# Patient Record
Sex: MALE | Race: ASIAN | URBAN - METROPOLITAN AREA
[De-identification: names, ages, dates, MRNs, and addresses within clinical notes are randomized per-mention and may not be internally consistent; named-entity substitution may affect disease eponyms.]

---

## 2017-07-10 ENCOUNTER — TELEPHONE (OUTPATIENT)
Dept: TRANSPLANT | Facility: CLINIC | Age: 62
End: 2017-07-10

## 2017-07-10 NOTE — LETTER
August 4, 2017    Dada Hansen  [address] [facility] [address]      Dear Mr. Hansen,    Attached is your Pre Kidney Evaluation schedule on August 14 & 16, 2017. Please feel free to contact me at [phone], if you have any questions.       Sincerely,     Nicole MARIANO    CC:Susan DEY                                                      [facility]  [address] [place] [address]  [id number]    EVALUATION SCHEDULE: KIDNEY TRANSPLANT SLOT 1 EVAL    Patient:   DADA HANSEN   MR#:    [id number]  Coordinator:  Susan DEY     [phone]  :   Nicole MARIANO     [phone]  Location:   [facility] 3[facility]  Date:    August 14 & 16, 2017    This is your evaluation schedule, please follow dates and times.  You   will receive reminder phone calls for other tests, but please follow this  schedule only!  If you have any questions about dates and times,  please call us on number listed above.  Thank you, Transplant Clinic.     Day/Date:   Monday, August 14, 2017  Time Location Activity   10:00a.m. [facility]  Transplant Services  3rd floor; Clinic 3A  CONSULT ROOM 3.8 Pre-transplant class with Susan DEY     NO FOOD or DRINK AFTER MIDNIGHT  (You may only have small amounts of water)    Day/Date:   Wednesday, August 16, 2017  Time Location Activity   7:30a.m. [facility]  Imaging and Lab Testing  1st floor Blood tests (fasting, PLEASE)   8:00a.m. BREAKFAST    8:30a.m. [facility]  Transplant Services  3rd floor; Clinic 3A Check in & go thru evaluation schedule for the day, get vitals & medication records   9:00a.m. [facility]  Transplant Services  3rd floor; Clinic 3A Appointment with Dr. Plata,   Transplant Surgeon   9:45a.m. [facility]  Transplant Services  3rd floor; Clinic 3B Appointment with   Christoph,  Transplant Nephrologist   10:30a.m. [facility]  Transplant Services  3rd floor; Clinic 3A; Consult Room Appointment with either Leslie Foote  Transplant    11:15a.m. [facility]  Transplant Services  3rd floor; Clinic 3A Meet with Susan DEY  Transplant Coordinator   11:30a.m. [facility]  Transplant Services  3rd floor; Clinic 3A; Consult Room Appointment with Elisabeth Reina,  Registered Dietitian   12:00Noon-12:15p.m. [facility]  Transplant Services  3rd floor; Clinic 3A; Consult Room Research    12:15p.m.-  1:15p.m. LUNCH    1:30p.m. [facility]  Imaging and Lab Testing  1st floor 2nd ABO blood draw - confirmation of 1st draw   2:00p.m. [facility]  Imaging and Lab Testing  1st floor Chest X-ray    2:20p.m. [facility]  Imaging and Lab Testing  1st floor EKG   3:30p.m. [facility]   Cardiology/Heart Care Clinic  3rd floor; Clinic 3L Echocardiogram       PLEASE CALL [phone] TO SCHEDULE:     ? ADAN SCAN STRESS TEST  ? AORTA/ILIAC ULTRASOUND  ? AORTA ULTRASOUND   ? CARDIOLOGY CONSULT

## 2017-07-10 NOTE — LETTER
07/12/17    Dada Medley  [address] [facility] [address]          Dear Dada,    Thank you for your interest in the Transplant Center at [facility], [facility]. We look forward to being a part your care team and assisting you through the transplant process.    As we discussed, your transplant coordinator is Susan Keyes ([phone]).  You may call your coordinator at any time with questions or concerns.    Please complete the following.    1. Sign up for:    [employer], your electronic medical record    [employer], the Transplant Center's website (see enclosed booklet)    You can use these tools to learn more about your transplant, communicate with your care team, and track your medical details    2. Fill out and return the enclosed forms    Authorization for Electronic Communication    Authorization to Discuss Protected Health Information    [employer] Release of Information       Best Wishes,      Solid Organ Transplant Intake  [facility], [facility]    cc: Referring Physician: Jake Reyes

## 2017-07-12 NOTE — TELEPHONE ENCOUNTER
Intake Progress Note  Organ:  Kidney    Referral Came Via Fax    Referring Physician (outside provider, if patient does not remember the name of provider contact clinic or dialysis unit to get this information) :  Jake Reyes  (If inside referral, ask who their community nephrologist is that sent the to [facility])    Assigned Coordinator:  Susan Keyes    Reported Diagnosis that caused the kidney failure ( not CKD)  Kidney Failure    Best time patient can be reached:  anytime    How would you like to be communicated with, through MyChart, phone, or mail? [place], phone      Records:  [facility] from: [facility], Dr. Reyes, Renal Specialist of [facility], [facility]       Insurance information:  BC  Policy parks:  self  Subscriber/policy/ID number:  [id number]  Group Number:  [id number]    History of diabetes: no  Type: no    If yes, age of onset:  n/a    Do you have an endocrinologist?: no  Name and Location:  no         On insulin or oral medication:  Oral meds          What type of insulin and how many units? n/a          History of a kidney biopsy:  yes         If Yes,when and where:  [facility], 2017    Past Medical and Surgical History (updated in Epic medical / surgical):             History of  cancer personally:  no, What type? n/a              Where and when was it treated?n/a                            History of cardiac events: no             When and where was it was it treated? no             History abdominal surgeries: no,  other than previous transplant: no What type?n/a              Where and when? n/a              History of previous transplant:n/a             If Yes where and estimated date:  n/a             Listed or in eval at another transplant center?  no             History of hospitalization in last 12 months:  yes               If Yes:  [facility] for a fistula placement, Catheter  placement in chest               History of blood transfusion:  no               Smoking history:  no     Current smoker:  no  How much? n/a      Quit Date:  n/a    On dialysis: yes  [facility]                 Type of Dialysis: hemo   Start date:        Dialysis Days:  T,TH,S from 8-11am       If NYOD, estimated GFR:  n/a  Height:  5'0  Weight:  130  BMI:  25.39    Health Maintenance:  PAP:  n/a  Mammo:  n/a  Colon:  no  PSA:  no  Dental: 2 yrs ago  Vaccines : ?  Special Needs (ie--wheelchair, assistance, guardian, interpretor):  no    As for the next steps, as long as we are able to get financial approval and receive your medical records, you should be expecting a call from your Transplant Coordinator in about 2 weeks. Your Transplant Coordinator will go into more detail about the evaluation process, but we can put a hold on a tentative date for your transplant evaluation now. Kidney (K/P) evaluations are scheduled for Monday, Wednesday, or Thursdays, and can start as early as 6:30 am and end as late as 4:30pm. Would one of these days work better for you? Great, I am going to put a hold on Day/Month for you. Again, this appointment day and time is subject to change and is dependent on financial approval from your insurance company and our receiving your medical records so we are able to meet your individual needs.    I also want to schedule your first call with the coordinator. (Offer a couple choices and schedule patient's preferred date and time.)      Inform patient on the need to arrange age appropriate cancer screening, vaccines up to date and dental clearance    Reviewed evaluation process and reminded patient to complete questionnaire, complete medical records release, and review packet prior to evaluation visit     Informed patient that coordinator will review their chart and insurance coverage and if no concerns they will receive a call from a  to schedule  evaluation

## 2017-07-17 ENCOUNTER — MEDICAL CORRESPONDENCE (OUTPATIENT)
Dept: TRANSPLANT | Facility: CLINIC | Age: 62
End: 2017-07-17

## 2017-07-25 ENCOUNTER — MEDICAL CORRESPONDENCE (OUTPATIENT)
Dept: TRANSPLANT | Facility: CLINIC | Age: 62
End: 2017-07-25

## 2017-07-28 DIAGNOSIS — Z76.82 ORGAN TRANSPLANT CANDIDATE: ICD-10-CM

## 2017-07-28 DIAGNOSIS — Z99.2 END STAGE RENAL DISEASE ON DIALYSIS (H): ICD-10-CM

## 2017-07-28 DIAGNOSIS — I10 HYPERTENSION: Primary | ICD-10-CM

## 2017-07-28 DIAGNOSIS — N18.6 END STAGE RENAL DISEASE ON DIALYSIS (H): ICD-10-CM

## 2017-07-28 NOTE — TELEPHONE ENCOUNTER
I /Susan was asked to May, daughter, I reached her and gave her my name and phone Contacted patient and introduced myself as their Transplant Coordinator, also introduced the role of the Transplant Coordinator in the transplant process.  Explained the purpose of this call including reviewing next steps and answering questions.    Confirmed Referring Provider, Dialysis Center, and Primary Care Physician. Notified patient of the importance of continued communication with referring providers and primary care physicians.    Reviewed components of transplant evaluation process including necessary appointments, tests, and procedures.    Answered questions for patient regarding evaluation, provided my name and contact information and requested they call with any additional questions.    Determined that patient would like additional information regarding transplant by:   Phone Call  Notified patients that they will hear from a Transplant  to schedule evaluation.     PLAN private Hmong class with Malu on 8/14 if possible 10-12 or 1-3 [place] [facility]  Full block on Weds 8/16;  Daughter May would like call to confirm these dates. MAy is driving her father up from [place] on Sunday 8/13.  Call May [phone].  Orders in [facility].  Third day will be with cardiologist in Oct or Nov arrange with May.   Linwood Guerrero MD  [address] [place] [address], [place] [address]  Phone [phone]

## 2017-08-01 ENCOUNTER — DOCUMENTATION ONLY (OUTPATIENT)
Dept: TRANSPLANT | Facility: CLINIC | Age: 62
End: 2017-08-01

## 2017-08-14 ENCOUNTER — ALLIED HEALTH/NURSE VISIT (OUTPATIENT)
Dept: TRANSPLANT | Facility: CLINIC | Age: 62
End: 2017-08-14
Attending: INTERNAL MEDICINE
Payer: COMMERCIAL

## 2017-08-14 DIAGNOSIS — Z01.818 ENCOUNTER FOR PRE-TRANSPLANT EVALUATION FOR KIDNEY TRANSPLANT: Primary | ICD-10-CM

## 2017-08-14 NOTE — PROGRESS NOTES
Patient attended the Pre Kidney/Pancreas Transplant Education Class today with his daughter May and Hmong . All were very attentive and engaged throughout the class and asked few questions. I introduced the [facility] website and encouraged them to access it for further education. In addition to the standard video content. I reviewed living donation paired exchange, KDPI, typical length of stay and the need to stay locally post transplant discharge.

## 2017-08-14 NOTE — MR AVS SNAPSHOT
After Visit Summary   8/14/2017    Dada Medley    MRN: [id number]           Patient Information     Date Of Birth          1955        Visit Information        Provider Department      8/14/2017 10:00 AM [place] [employer]; UC TRANSPLANT CLASS [facility] Solid Organ Transplant        Today's Diagnoses     Encounter for pre-transplant evaluation for kidney transplant    -  1       Follow-ups after your visit        Your next 10 appointments already scheduled     Aug 16, 2017  7:30 AM CDT   (Arrive by 7:00 AM)   Lab with MARLENY LAB   [facility] ([facility])    [address] [place] [id number]   [phone]            Aug 16, 2017  8:45 AM CDT   Surgery Consult with UC PKE PATIENT 1   [facility] Solid Organ Transplant ([facility])    [address] [place] [phone]   [phone]            Aug 16, 2017  9:45 AM CDT   Nephrology Consult with UC PKE PATIENT 1   [facility] Solid Organ Transplant ([facility])    [address] [place] [id number] [phone]            Aug 16, 2017 10:15 AM CDT   SOT SOCIAL WORK EVAL with SAMAN Jones   [facility] Transplant ([facility])    [address] [place] [phone]            Aug 16, 2017 11:00 AM CDT   SOT CARE COORDINATOR EVAL with Susan Keyes, RN   [facility] Transplant ([facility])    [address] [place] [phone]   [phone]            Aug 16, 2017 11:30 AM CDT   NUTRITION VISIT with Smita Reina RD   [facility] Solid Organ Transplant ([facility])    [address] [place] [phone]            Aug 16, 2017  1:30 PM CDT   Lab with UC LAB    [facility] ([facility])    [address] [id number] [phone]            Aug 16, 2017  1:45 PM CDT   XR CHEST 2 VIEWS with [email or web address]   [facility] Xray ([facility])    [address] [place] [phone]           Please bring a list of your current medicines to your exam. (Include vitamins, minerals and over-thecounter medicines.) Leave your valuables at home.  Tell your doctor if there is a chance you may be pregnant.  You do not need to do anything special for this exam.            Aug 16, 2017  2:05 PM CDT   ecg with UC CV EKG   [facility] Xray ([facility])    [address] [place] [phone]            Aug 16, 2017  3:30 PM CDT   Ech Complete with [id number]   [facility] Echo ([facility])    [address] [place] [id number] [phone]           1. Please bring or wear a comfortable two-piece outfit. 2. You may eat, drink and take your normal medicines. 3. For any questions that cannot be answered, please contact the ordering physician              Who to contact     If you have questions or need follow up information about today's clinic visit or your schedule please contact [facility] SOLID ORGAN TRANSPLANT directly at [phone].  Normal or non-critical lab and imaging results will be communicated to you by MyChart, letter or phone within 4 business days after the clinic has received the results. If you do not hear from us within 7 days, please contact the clinic through MyChart or phone. If you have a critical or abnormal lab result, we will notify you by phone as soon as possible.  Submit refill requests through [employer] or call your pharmacy and they will forward the refill request to us. Please allow 3 business days for your refill to be completed.          Additional  Information About Your Visit        [employer] Information     [facility] [employer] gives you secure access to your electronic health record. If you see a primary care provider, you can also send messages to your care team and make appointments. If you have questions, please call your primary care clinic.  If you do not have a primary care provider, please call [phone] and they will assist you.        Care EveryWhere ID     This is your Care EveryWhere ID. This could be used by other organizations to access your [facility] medical records  [id number]         Blood Pressure from Last 3 Encounters:   No data found for BP    Weight from Last 3 Encounters:   No data found for Wt              Today, you had the following     No orders found for display       Primary Care Provider Office Phone # Fax #    Linwood Guerrero [phone]       [address] [address]        Equal Access to Services     MELLISA RAMIREZ : Hadii aad ku hadasho Sohusam, waaxda luqadaha, qaybta kaalmada lonny, ramona gaines . So [facility] [phone].    ATENCIÓN: Si habla español, tiene a polanco disposición servicios gratuitos de asistencia lingüística. Leonardame al [phone].    We comply with applicable federal civil rights laws and [place] laws. We do not discriminate on the basis of race, color, national origin, age, disability sex, sexual orientation or gender identity.            Thank you!     Thank you for choosing [facility] SOLID ORGAN TRANSPLANT  for your care. Our goal is always to provide you with excellent care. Hearing back from our patients is one way we can continue to improve our services. Please take a few minutes to complete the written survey that you may receive in the mail after your visit with us. Thank you!             Your Updated Medication List - Protect others around you: Learn how to safely use, store and throw away your medicines at  www.disposemymeds.org.      Notice  As of 8/14/2017 12:31 PM    You have not been prescribed any medications.

## 2017-08-16 ENCOUNTER — OFFICE VISIT (OUTPATIENT)
Dept: TRANSPLANT | Facility: CLINIC | Age: 62
End: 2017-08-16
Attending: TRANSPLANT SURGERY
Payer: COMMERCIAL

## 2017-08-16 ENCOUNTER — RESULTS ONLY (OUTPATIENT)
Dept: OTHER | Facility: CLINIC | Age: 62
End: 2017-08-16

## 2017-08-16 ENCOUNTER — ALLIED HEALTH/NURSE VISIT (OUTPATIENT)
Dept: TRANSPLANT | Facility: CLINIC | Age: 62
End: 2017-08-16
Attending: INTERNAL MEDICINE
Payer: COMMERCIAL

## 2017-08-16 ENCOUNTER — RADIANT APPOINTMENT (OUTPATIENT)
Dept: CARDIOLOGY | Facility: CLINIC | Age: 62
End: 2017-08-16
Attending: PHYSICIAN ASSISTANT

## 2017-08-16 VITALS
HEART RATE: 86 BPM | BODY MASS INDEX: 26.5 KG/M2 | OXYGEN SATURATION: 98 % | TEMPERATURE: 97 F | DIASTOLIC BLOOD PRESSURE: 72 MMHG | SYSTOLIC BLOOD PRESSURE: 114 MMHG | WEIGHT: 135 LBS | HEIGHT: 60 IN

## 2017-08-16 VITALS
HEIGHT: 60 IN | OXYGEN SATURATION: 98 % | WEIGHT: 135 LBS | DIASTOLIC BLOOD PRESSURE: 72 MMHG | HEART RATE: 86 BPM | SYSTOLIC BLOOD PRESSURE: 114 MMHG | BODY MASS INDEX: 26.5 KG/M2 | TEMPERATURE: 97 F

## 2017-08-16 DIAGNOSIS — I10 HYPERTENSION: ICD-10-CM

## 2017-08-16 DIAGNOSIS — Z76.82 ORGAN TRANSPLANT CANDIDATE: ICD-10-CM

## 2017-08-16 DIAGNOSIS — Z99.2 END STAGE RENAL DISEASE ON DIALYSIS (H): ICD-10-CM

## 2017-08-16 DIAGNOSIS — Z01.818 PRE-TRANSPLANT EVALUATION FOR END STAGE RENAL DISEASE: Primary | ICD-10-CM

## 2017-08-16 DIAGNOSIS — N18.6 END STAGE RENAL DISEASE ON DIALYSIS (H): ICD-10-CM

## 2017-08-16 DIAGNOSIS — Z76.82 ORGAN TRANSPLANT CANDIDATE: Primary | ICD-10-CM

## 2017-08-16 DIAGNOSIS — Z01.818 PRE-OP EXAM: ICD-10-CM

## 2017-08-16 DIAGNOSIS — N18.6 END STAGE RENAL DISEASE ON DIALYSIS (H): Primary | ICD-10-CM

## 2017-08-16 DIAGNOSIS — Z99.2 END STAGE RENAL DISEASE ON DIALYSIS (H): Primary | ICD-10-CM

## 2017-08-16 LAB
ABO + RH BLD: NORMAL
ALBUMIN SERPL-MCNC: 3.6 G/DL (ref 3.4–5)
ALBUMIN UR-MCNC: 100 MG/DL
ALP SERPL-CCNC: 70 U/L (ref 40–150)
ALT SERPL W P-5'-P-CCNC: 28 U/L (ref 0–70)
ANION GAP SERPL CALCULATED.3IONS-SCNC: 13 MMOL/L (ref 3–14)
APPEARANCE UR: CLEAR
APTT PPP: 29 SEC (ref 22–37)
AST SERPL W P-5'-P-CCNC: 17 U/L (ref 0–45)
BASOPHILS # BLD AUTO: 0.1 10E9/L (ref 0–0.2)
BASOPHILS NFR BLD AUTO: 0.6 %
BILIRUB SERPL-MCNC: 0.5 MG/DL (ref 0.2–1.3)
BILIRUB UR QL STRIP: NEGATIVE
BLD GP AB SCN SERPL QL: NORMAL
BLD GP AB SCN TITR SERPL: NORMAL {TITER}
BLOOD BANK CMNT PATIENT-IMP: NORMAL
BUN SERPL-MCNC: 63 MG/DL (ref 7–30)
CALCIUM SERPL-MCNC: 8.5 MG/DL (ref 8.5–10.1)
CARDIOLIPIN ANTIBODY IGG: <1.6 GPL-U/ML (ref 0–19.9)
CARDIOLIPIN ANTIBODY IGM: 0.2 MPL-U/ML (ref 0–19.9)
CHLORIDE SERPL-SCNC: 96 MMOL/L (ref 94–109)
CHOLEST SERPL-MCNC: 194 MG/DL
CMV IGG SERPL QL IA: >8 AI (ref 0–0.8)
CO2 SERPL-SCNC: 26 MMOL/L (ref 20–32)
COLOR UR AUTO: ABNORMAL
CREAT SERPL-MCNC: 6.15 MG/DL (ref 0.66–1.25)
DIFFERENTIAL METHOD BLD: ABNORMAL
EBV VCA IGG SER QL IA: >8 AI (ref 0–0.8)
EOSINOPHIL # BLD AUTO: 0.2 10E9/L (ref 0–0.7)
EOSINOPHIL NFR BLD AUTO: 1.6 %
ERYTHROCYTE [DISTWIDTH] IN BLOOD BY AUTOMATED COUNT: 13.9 % (ref 10–15)
GFR SERPL CREATININE-BSD FRML MDRD: 9 ML/MIN/1.7M2
GLUCOSE SERPL-MCNC: 89 MG/DL (ref 70–99)
GLUCOSE UR STRIP-MCNC: NEGATIVE MG/DL
HBV CORE AB SERPL QL IA: NONREACTIVE
HBV SURFACE AB SERPL IA-ACNC: 28.05 M[IU]/ML
HBV SURFACE AG SERPL QL IA: NONREACTIVE
HCT VFR BLD AUTO: 39.9 % (ref 40–53)
HCV AB SERPL QL IA: NONREACTIVE
HDLC SERPL-MCNC: 44 MG/DL
HGB BLD-MCNC: 12.4 G/DL (ref 13.3–17.7)
HGB UR QL STRIP: ABNORMAL
HIV 1+2 AB+HIV1 P24 AG SERPL QL IA: NONREACTIVE
IMM GRANULOCYTES # BLD: 0.1 10E9/L (ref 0–0.4)
IMM GRANULOCYTES NFR BLD: 1.2 %
INR PPP: 1.08 (ref 0.86–1.14)
KETONES UR STRIP-MCNC: NEGATIVE MG/DL
LDLC SERPL CALC-MCNC: ABNORMAL MG/DL
LEUKOCYTE ESTERASE UR QL STRIP: ABNORMAL
LYMPHOCYTES # BLD AUTO: 2.7 10E9/L (ref 0.8–5.3)
LYMPHOCYTES NFR BLD AUTO: 23.2 %
MCH RBC QN AUTO: 30.2 PG (ref 26.5–33)
MCHC RBC AUTO-ENTMCNC: 31.1 G/DL (ref 31.5–36.5)
MCV RBC AUTO: 97 FL (ref 78–100)
MONOCYTES # BLD AUTO: 1.2 10E9/L (ref 0–1.3)
MONOCYTES NFR BLD AUTO: 10.4 %
MUCOUS THREADS #/AREA URNS LPF: PRESENT /LPF
NEUTROPHILS # BLD AUTO: 7.3 10E9/L (ref 1.6–8.3)
NEUTROPHILS NFR BLD AUTO: 63 %
NITRATE UR QL: NEGATIVE
NONHDLC SERPL-MCNC: 150 MG/DL
NRBC # BLD AUTO: 0 10*3/UL
NRBC BLD AUTO-RTO: 0 /100
PH UR STRIP: 5 PH (ref 5–7)
PHOSPHATE SERPL-MCNC: 6.9 MG/DL (ref 2.5–4.5)
PLATELET # BLD AUTO: 187 10E9/L (ref 150–450)
POTASSIUM SERPL-SCNC: 4 MMOL/L (ref 3.4–5.3)
PROT SERPL-MCNC: 8.8 G/DL (ref 6.8–8.8)
PSA SERPL-ACNC: 1.96 UG/L (ref 0–4)
RBC # BLD AUTO: 4.1 10E12/L (ref 4.4–5.9)
RBC #/AREA URNS AUTO: <1 /HPF (ref 0–2)
SODIUM SERPL-SCNC: 135 MMOL/L (ref 133–144)
SOURCE: ABNORMAL
SP GR UR STRIP: 1.01 (ref 1–1.03)
SPECIMEN EXP DATE BLD: NORMAL
SPECIMEN EXP DATE BLD: NORMAL
T PALLIDUM IGG+IGM SER QL: NEGATIVE
THROMBIN TIME: 16.9 SEC (ref 13–19)
TRIGL SERPL-MCNC: 411 MG/DL
UROBILINOGEN UR STRIP-MCNC: 0 MG/DL (ref 0–2)
VZV IGG SER QL IA: 2.9 AI (ref 0–0.8)
WBC # BLD AUTO: 11.6 10E9/L (ref 4–11)
WBC #/AREA URNS AUTO: 3 /HPF (ref 0–2)

## 2017-08-16 PROCEDURE — 86480 TB TEST CELL IMMUN MEASURE: CPT | Performed by: PHYSICIAN ASSISTANT

## 2017-08-16 PROCEDURE — 85610 PROTHROMBIN TIME: CPT | Performed by: PHYSICIAN ASSISTANT

## 2017-08-16 PROCEDURE — 81376 HLA II TYPING 1 LOCUS LR: CPT | Performed by: SURGERY

## 2017-08-16 PROCEDURE — 86886 COOMBS TEST INDIRECT TITER: CPT | Performed by: PHYSICIAN ASSISTANT

## 2017-08-16 PROCEDURE — 40000866 ZZHCL STATISTIC HIV 1/2 ANTIGEN/ANTIBODY PRETRANSPLANT ONLY: Performed by: PHYSICIAN ASSISTANT

## 2017-08-16 PROCEDURE — 80061 LIPID PANEL: CPT | Performed by: PHYSICIAN ASSISTANT

## 2017-08-16 PROCEDURE — 81240 F2 GENE: CPT | Performed by: PHYSICIAN ASSISTANT

## 2017-08-16 PROCEDURE — 86833 HLA CLASS II HIGH DEFIN QUAL: CPT | Performed by: SURGERY

## 2017-08-16 PROCEDURE — 86147 CARDIOLIPIN ANTIBODY EA IG: CPT | Performed by: PHYSICIAN ASSISTANT

## 2017-08-16 PROCEDURE — 86900 BLOOD TYPING SEROLOGIC ABO: CPT | Mod: 91 | Performed by: PHYSICIAN ASSISTANT

## 2017-08-16 PROCEDURE — 84100 ASSAY OF PHOSPHORUS: CPT | Performed by: PHYSICIAN ASSISTANT

## 2017-08-16 PROCEDURE — 86704 HEP B CORE ANTIBODY TOTAL: CPT | Performed by: PHYSICIAN ASSISTANT

## 2017-08-16 PROCEDURE — 86901 BLOOD TYPING SEROLOGIC RH(D): CPT | Performed by: PHYSICIAN ASSISTANT

## 2017-08-16 PROCEDURE — 86803 HEPATITIS C AB TEST: CPT | Performed by: PHYSICIAN ASSISTANT

## 2017-08-16 PROCEDURE — 81370 HLA I & II TYPING LR: CPT | Performed by: SURGERY

## 2017-08-16 PROCEDURE — 86787 VARICELLA-ZOSTER ANTIBODY: CPT | Performed by: PHYSICIAN ASSISTANT

## 2017-08-16 PROCEDURE — 85730 THROMBOPLASTIN TIME PARTIAL: CPT | Performed by: PHYSICIAN ASSISTANT

## 2017-08-16 PROCEDURE — 81241 F5 GENE: CPT | Performed by: PHYSICIAN ASSISTANT

## 2017-08-16 PROCEDURE — 87340 HEPATITIS B SURFACE AG IA: CPT | Performed by: PHYSICIAN ASSISTANT

## 2017-08-16 PROCEDURE — 81001 URINALYSIS AUTO W/SCOPE: CPT | Performed by: PHYSICIAN ASSISTANT

## 2017-08-16 PROCEDURE — 85613 RUSSELL VIPER VENOM DILUTED: CPT | Performed by: PHYSICIAN ASSISTANT

## 2017-08-16 PROCEDURE — 80053 COMPREHEN METABOLIC PANEL: CPT | Performed by: PHYSICIAN ASSISTANT

## 2017-08-16 PROCEDURE — 86706 HEP B SURFACE ANTIBODY: CPT | Performed by: PHYSICIAN ASSISTANT

## 2017-08-16 PROCEDURE — 86832 HLA CLASS I HIGH DEFIN QUAL: CPT | Performed by: SURGERY

## 2017-08-16 PROCEDURE — 85670 THROMBIN TIME PLASMA: CPT | Performed by: PHYSICIAN ASSISTANT

## 2017-08-16 PROCEDURE — G0103 PSA SCREENING: HCPCS | Performed by: PHYSICIAN ASSISTANT

## 2017-08-16 PROCEDURE — 86644 CMV ANTIBODY: CPT | Performed by: PHYSICIAN ASSISTANT

## 2017-08-16 PROCEDURE — 86850 RBC ANTIBODY SCREEN: CPT | Performed by: PHYSICIAN ASSISTANT

## 2017-08-16 PROCEDURE — 86780 TREPONEMA PALLIDUM: CPT | Performed by: PHYSICIAN ASSISTANT

## 2017-08-16 PROCEDURE — 86665 EPSTEIN-BARR CAPSID VCA: CPT | Performed by: PHYSICIAN ASSISTANT

## 2017-08-16 PROCEDURE — 85025 COMPLETE CBC W/AUTO DIFF WBC: CPT | Performed by: PHYSICIAN ASSISTANT

## 2017-08-16 PROCEDURE — 86905 BLOOD TYPING RBC ANTIGENS: CPT | Performed by: PHYSICIAN ASSISTANT

## 2017-08-16 ASSESSMENT — PAIN SCALES - GENERAL
PAINLEVEL: NO PAIN (0)
PAINLEVEL: NO PAIN (0)
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
## 2017-08-16 NOTE — MR AVS SNAPSHOT
After Visit Summary   8/16/2017    Dada Medley    MRN: [id number]           Patient Information     Date Of Birth          1955        Visit Information        Provider Department      8/16/2017 8:45 AM DARLEEN/DARI MAHARAJ;  PKE PATIENT 1 [facility] Solid Organ Transplant        Today's Diagnoses     End stage renal disease on dialysis (H)    -  1       Follow-ups after your visit        Your next 10 appointments already scheduled     Aug 16, 2017  9:45 AM CDT   Nephrology Consult with  PKE PATIENT 1   [facility] Solid Organ Transplant ([facility])    [address] [place] [id number] [phone]            Aug 16, 2017 10:15 AM CDT   SOT SOCIAL WORK EVAL with SAMAN Cruz   [facility] Solid Organ Transplant ([facility])    [address] [place] [id number] [phone]            Aug 16, 2017 11:00 AM CDT   SOT CARE COORDINATOR EVAL with Susan Keyes RN   [facility] Solid Organ Transplant ([facility])    [address] [place] [address] [id number] [phone]            Aug 16, 2017 11:30 AM CDT   NUTRITION VISIT with Smita Reina RD   [facility] Solid Organ Transplant ([facility])    [address] [place] [address] [id number] [phone]            Aug 16, 2017  1:30 PM CDT   Lab with  LAB    [facility] ([facility])    [address] [place] [address] [id number] [phone]            Aug 16, 2017  1:45 PM CDT   XR CHEST 2 VIEWS with XR1   [facility] Xray ([facility])    [address] [place] [address] [id number] [phone]           Please bring a list of your current medicines to your exam. (Include vitamins, minerals and over-thecounter medicines.) Leave your valuables at home.  Tell your  doctor if there is a chance you may be pregnant.  You do not need to do anything special for this exam.            Aug 16, 2017  2:05 PM CDT   ecg with  CV EKG   [facility] Xray ([facility])    [address]  [address] [facility] [address] [phone]            Aug 16, 2017  3:30 PM CDT   Ech Complete with [id number]   [facility] Echo ([facility])    [address]  [address] [facility] [address] [phone]           1. Please bring or wear a comfortable two-piece outfit. 2. You may eat, drink and take your normal medicines. 3. For any questions that cannot be answered, please contact the ordering physician              Who to contact     If you have questions or need follow up information about today's clinic visit or your schedule please contact [facility] SOLID ORGAN TRANSPLANT directly at [phone].  Normal or non-critical lab and imaging results will be communicated to you by Meepshart, letter or phone within 4 business days after the clinic has received the results. If you do not hear from us within 7 days, please contact the clinic through [employer] or phone. If you have a critical or abnormal lab result, we will notify you by phone as soon as possible.  Submit refill requests through [employer] or call your pharmacy and they will forward the refill request to us. Please allow 3 business days for your refill to be completed.          Additional Information About Your Visit        MeepsharVionic Information     [employer] gives you secure access to your electronic health record. If you see a primary care provider, you can also send messages to your care team and make appointments. If you have questions, please call your primary care clinic.  If you do not have a primary care provider, please call [phone] and they will assist you.        Care EveryWhere ID     This is your Care EveryWhere ID. This could be used by other  "organizations to access your [place] medical records  [id number]        Your Vitals Were     Pulse Temperature Height Pulse Oximetry BMI (Body Mass Index)       86 97  F (36.1  C) (Oral) 1.511 m (4' 11.5\") 98% 26.81 kg/m2        Blood Pressure from Last 3 Encounters:   08/16/17 114/72    Weight from Last 3 Encounters:   08/16/17 61.2 kg (135 lb)              Today, you had the following     No orders found for display       Primary Care Provider Office Phone # Fax #    Linwood Guerrero [phone]       [address] [address]        Equal Access to Services     JONI RAMIREZ : Chichi Weinstein, timothy simpson, del mukherjee, ramona gaines . So [facility] [phone].    ATENCIÓN: Si habla español, tiene a polanco disposición servicios gratuitos de asistencia lingüística. Llame al [phone].    We comply with applicable federal civil rights laws and [place] laws. We do not discriminate on the basis of race, color, national origin, age, disability sex, sexual orientation or gender identity.            Thank you!     Thank you for choosing [facility] SOLID ORGAN TRANSPLANT  for your care. Our goal is always to provide you with excellent care. Hearing back from our patients is one way we can continue to improve our services. Please take a few minutes to complete the written survey that you may receive in the mail after your visit with us. Thank you!             Your Updated Medication List - Protect others around you: Learn how to safely use, store and throw away your medicines at www.disposemymeds.org.          This list is accurate as of: 8/16/17  9:20 AM.  Always use your most recent med list.                   Brand Name Dispense Instructions for use Diagnosis    LOSARTAN POTASSIUM PO      Take 50 mg by mouth daily        PREDNISONE PO      Take 20 mg by mouth 2 times daily          "

## 2017-08-16 NOTE — MR AVS SNAPSHOT
After Visit Summary   8/16/2017    Dada Medley    MRN: [id number]           Patient Information     Date Of Birth          1955        Visit Information        Provider Department      8/16/2017 11:30 AM Smita Reina RD; DARLEEN/DARI MAHARAJ [facility] Solid Organ Transplant        Today's Diagnoses     Organ transplant candidate    -  1       Follow-ups after your visit        Who to contact     If you have questions or need follow up information about today's clinic visit or your schedule please contact [facility] SOLID ORGAN TRANSPLANT directly at [phone].  Normal or non-critical lab and imaging results will be communicated to you by Crashlyticshart, letter or phone within 4 business days after the clinic has received the results. If you do not hear from us within 7 days, please contact the clinic through SIL4 Systemst or phone. If you have a critical or abnormal lab result, we will notify you by phone as soon as possible.  Submit refill requests through TrepUp or call your pharmacy and they will forward the refill request to us. Please allow 3 business days for your refill to be completed.          Additional Information About Your Visit        MyChart Information     TrepUp gives you secure access to your electronic health record. If you see a primary care provider, you can also send messages to your care team and make appointments. If you have questions, please call your primary care clinic.  If you do not have a primary care provider, please call [phone] and they will assist you.        Care EveryWhere ID     This is your Care EveryWhere ID. This could be used by other organizations to access your [facility] medical records  [id number]         Blood Pressure from Last 3 Encounters:   08/16/17 114/72   08/16/17 114/72    Weight from Last 3 Encounters:   08/16/17 61.2 kg (135 lb)   08/16/17 61.2 kg (135 lb)              Today, you had the following     No orders found for display       Primary  Care Provider Office Phone # Fax #    Linwood Guerrero [phone]       [address] [address]        Equal Access to Services     MELLISA RAMIREZ : Hadii aad ku hadtiffanietorsten Sohusam, wagustavoda luqadaha, qaybta kaalmada lonny, ramona tacoin hayaacarlee fannnekalisa moeller. So [facility] [phone].    ATENCIÓN: Si habla español, tiene a polanco disposición servicios gratuitos de asistencia lingüística. Llame al [phone].    We comply with applicable federal civil rights laws and [place] laws. We do not discriminate on the basis of race, color, national origin, age, disability sex, sexual orientation or gender identity.            Thank you!     Thank you for choosing [facility] SOLID ORGAN TRANSPLANT  for your care. Our goal is always to provide you with excellent care. Hearing back from our patients is one way we can continue to improve our services. Please take a few minutes to complete the written survey that you may receive in the mail after your visit with us. Thank you!             Your Updated Medication List - Protect others around you: Learn how to safely use, store and throw away your medicines at www.disposemymeds.org.          This list is accurate as of: 8/16/17 11:59 PM.  Always use your most recent med list.                   Brand Name Dispense Instructions for use Diagnosis    LOSARTAN POTASSIUM PO      Take 50 mg by mouth daily        PREDNISONE PO      Take 20 mg by mouth 2 times daily

## 2017-08-16 NOTE — PROGRESS NOTES
Psychosocial Assessment  Patient Name/ Age: Dada Medley 61 year old   Medical Record #: [id number]  Duration of Interview:  45 min  Process:   Face-to-Face Interview                (counseling < 50%)   Present at Appointment: Dada, daughter May, and Chris Interpretor        : BRUNO Jackson  Date:  2017        Type of transplant: Kidney    Donor type: Dada reported he does not have any donors at this time.      Cadaver   Prior Transplants:    No Status of Transplant: N/A       Current Living Situation    Location:   [address] [place] [id number]  With Whom: lives with their family (wife and four sons)       Family/ Social Support:    Dada has six adult children- he lives with his four sons. He has two daughters, one lives in [place] and his daughter, May, lives in [place]. Dada has one sibling who also lives in [place]. Dada reported he has several half siblings who still live in [place], where he is from. Dada reported his parents are .    available, helpful   Committed relationship: Dada reported he is  to Cheyanne Aaron.     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   Income   salary/wages   Insurance      At this time, patient can afford medication costs:  Yes  private insurance- [employer] [facility] which he pays privately       Medical Status    Current mode of treatment for ESRD Dialysis since 2017   Complications none       Behavioral    Tobacco Use No Chemical Dependency No   Long denied any tobacco use.  Dada reported he may have one alcoholic drink during cultural ceremonies  "which happen \"very rarely\". Dada denied any substance use.     Psychiatric Impairment No  Long denied any current or history of anxiety, depression, or other mental health concerns. Dada did discuss his time in the [place] war, which his daughter reported going through his kidney disease/being on dialysis has brought him to talk about his time in the war more often.     Reading ability: Okay  Dada reported he completed college in [place] [facility]. Dada reported he is able to read basic English and is able to speak/understand some English. His family helps him read information that he is not able to understand.  Recent Legal History No      Coping Style/Strategies: Talking to family       Ability to Adhere to Complex Medical Regime: Yes     Adherence History: Dada reported he follows his physician's recommendations, takes his medications as prescribed, and attends his appointments.         Education  _X_ Medicare  _X_ Rehabilitation  _X_ Donor issues  _X_ Community resources  _X_ Post discharge housing  _X_ Financial resources  _X_ Medical insurance options  _X_ Psych adjustment  _X_ Family adjustment  _X_ Health Care Directive No   Psychosocial Risks of Transplant Reviewed and Discussed:  _X_ Increased stress related to emotional,            family, social, employment or financial           situation  _X_ Affect on work and/or disability benefits  _X_ Affect on future health and life           insurance  _X_ Transplant outcome expectations may           not be met  _X_ Mental Health Risks: anxiety,           depression, PTSD, guilt, grief and           chronic fatigue     Notable Items:   None noted. Dada and his family denied any financial concerns regarding making last minute travel accommodations if he is called for a transplant here in [place]. Dada reported he will stay with his daughter here in [place] post transplant.       Final Evaluation/Assessment   Patient seemed to process information well. Appeared well informed, motivated " and able to follow post transplant requirements. Behavior was appropriate during interview. Has adequate income and insurance coverage. Adequate social support. No major contraindications noted for transplant.  At this time patient appears to understand the risks and benefits of transplant.      Recommendation  Acceptable    Selection Criteria Met:  Plan for support Yes   Chemical Dependence Yes   Smoking Yes   Mental Health Yes   Adequate Finances Yes    Signature: BRUNO Jackson   Title: Clinical
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
## 2017-08-16 NOTE — MR AVS SNAPSHOT
After Visit Summary   8/16/2017    Dada Medley    MRN: [id number]           Patient Information     Date Of Birth          1955        Visit Information        Provider Department      8/16/2017 10:15 AM Dary Garrett MSW; DARLEEN/DARI MAHARAJ [facility] Solid Organ Transplant        Today's Diagnoses     Organ transplant candidate    -  1       Follow-ups after your visit        Who to contact     If you have questions or need follow up information about today's clinic visit or your schedule please contact [facility] SOLID ORGAN TRANSPLANT directly at [phone].  Normal or non-critical lab and imaging results will be communicated to you by Nexus eWaterhart, letter or phone within 4 business days after the clinic has received the results. If you do not hear from us within 7 days, please contact the clinic through Revolightst or phone. If you have a critical or abnormal lab result, we will notify you by phone as soon as possible.  Submit refill requests through Jumper Networks or call your pharmacy and they will forward the refill request to us. Please allow 3 business days for your refill to be completed.          Additional Information About Your Visit        MyChart Information     Jumper Networks gives you secure access to your electronic health record. If you see a primary care provider, you can also send messages to your care team and make appointments. If you have questions, please call your primary care clinic.  If you do not have a primary care provider, please call [phone] and they will assist you.        Care EveryWhere ID     This is your Care EveryWhere ID. This could be used by other organizations to access your [facility] medical records  [id number]         Blood Pressure from Last 3 Encounters:   08/16/17 114/72   08/16/17 114/72    Weight from Last 3 Encounters:   08/16/17 61.2 kg (135 lb)   08/16/17 61.2 kg (135 lb)              Today, you had the following     No orders found for display       Primary Care  Provider Office Phone # Fax #    Linwood Guerrero [phone]       [address] [address]        Equal Access to Services     MELLISA RAMIREZ : Hadii aad ku hadtiffanietorsten Sohusam, wagustavoda luqadaha, qaronitta kaalmada lonny, ramona tacoin hayaacarlee sandhu ryan liana moeller. So [facility] [place] [facility] [phone].    ATENCIÓN: Si habla español, tiene a polanco disposición servicios gratuitos de asistencia lingüística. Llame al [phone].    We comply with applicable federal civil rights laws and [place] laws. We do not discriminate on the basis of race, color, national origin, age, disability sex, sexual orientation or gender identity.            Thank you!     Thank you for choosing [facility] SOLID ORGAN TRANSPLANT  for your care. Our goal is always to provide you with excellent care. Hearing back from our patients is one way we can continue to improve our services. Please take a few minutes to complete the written survey that you may receive in the mail after your visit with us. Thank you!             Your Updated Medication List - Protect others around you: Learn how to safely use, store and throw away your medicines at www.disposemymeds.org.          This list is accurate as of: 8/16/17 11:59 PM.  Always use your most recent med list.                   Brand Name Dispense Instructions for use Diagnosis    LOSARTAN POTASSIUM PO      Take 50 mg by mouth daily        PREDNISONE PO      Take 20 mg by mouth 2 times daily

## 2017-08-16 NOTE — MR AVS SNAPSHOT
"              After Visit Summary   8/16/2017    Dada Medley    MRN: [id number]           Patient Information     Date Of Birth          1955        Visit Information        Provider Department      8/16/2017 9:45 AM DARLEEN/DARI MAHARAJ; MARLENY IBARRA PATIENT 1 [facility] Solid Organ Transplant        Today's Diagnoses     Pre-transplant evaluation for end stage renal disease    -  1    End stage renal disease on dialysis (H)        Pre-op exam           Follow-ups after your visit        Who to contact     If you have questions or need follow up information about today's clinic visit or your schedule please contact [facility] SOLID ORGAN TRANSPLANT directly at [phone].  Normal or non-critical lab and imaging results will be communicated to you by Quantum4Dhart, letter or phone within 4 business days after the clinic has received the results. If you do not hear from us within 7 days, please contact the clinic through Brainsgatet or phone. If you have a critical or abnormal lab result, we will notify you by phone as soon as possible.  Submit refill requests through RazorGator or call your pharmacy and they will forward the refill request to us. Please allow 3 business days for your refill to be completed.          Additional Information About Your Visit        MyChart Information     RazorGator gives you secure access to your electronic health record. If you see a primary care provider, you can also send messages to your care team and make appointments. If you have questions, please call your primary care clinic.  If you do not have a primary care provider, please call [phone] and they will assist you.        Care EveryWhere ID     This is your Care EveryWhere ID. This could be used by other organizations to access your [facility] medical records  [id number]        Your Vitals Were     Pulse Temperature Height Pulse Oximetry BMI (Body Mass Index)       86 97  F (36.1  C) 1.511 m (4' 11.5\") 98% 26.81 kg/m2        Blood Pressure from " Last 3 Encounters:   08/16/17 114/72   08/16/17 114/72    Weight from Last 3 Encounters:   08/16/17 61.2 kg (135 lb)   08/16/17 61.2 kg (135 lb)              Today, you had the following     No orders found for display       Primary Care Provider Office Phone # Fax #    Linwood Guerrero [phone]       [address] [address]        Equal Access to Services     MELLISA RAMIREZ : Hadii aad ku hadasho Soomaali, waaxda luqadaha, qaybta kaalmada adeegyada, waxay tacoin hayjean marien adenavarro gaines . So [facility] [phone].    ATENCIÓN: Si habla español, tiene a polanco disposición servicios gratuitos de asistencia lingüística. LlGrand [facility] [phone].    We comply with applicable federal civil rights laws and [place] laws. We do not discriminate on the basis of race, color, national origin, age, disability sex, sexual orientation or gender identity.            Thank you!     Thank you for choosing [facility] SOLID ORGAN TRANSPLANT  for your care. Our goal is always to provide you with excellent care. Hearing back from our patients is one way we can continue to improve our services. Please take a few minutes to complete the written survey that you may receive in the mail after your visit with us. Thank you!             Your Updated Medication List - Protect others around you: Learn how to safely use, store and throw away your medicines at www.disposemymeds.org.          This list is accurate as of: 8/16/17 11:59 PM.  Always use your most recent med list.                   Brand Name Dispense Instructions for use Diagnosis    LOSARTAN POTASSIUM PO      Take 50 mg by mouth daily        PREDNISONE PO      Take 20 mg by mouth 2 times daily
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
## 2017-08-16 NOTE — PROGRESS NOTES
NUTRITION KIDNEY TRANSPLANT EVALUATION  Medical Nutrition Therapy    Weight and BMI:  Current BMI: 26.8  BMI is within criteria of <35 for kidney transplant    Malnutrition Status:    Pt does not meet malnutrition criteria        Visit Type: Initial Assessment    Dada Medley referred by Dr. Tovar for MNT related to kidney transplant evaluation    Patient accompanied by his daughter, May (English speaking), and a [email or web address]     H/o previous txp: none    Nutrition Assessment:  Anthropometrics  Height:   59.5 in   BMI:    26.8    Weight Status:Overweight BMI 25-29.9   Weight:  135 lbs            IBW (lb): 105  % IBW: 129    Wt Hx: No major changes. Lost wt down to 130 lbs with HD and d/t low energy, but has since regained.     Adj/dosing BW: 113 lbs/51 kg       Recent Labs   Lab Test  08/16/17   0734   CHOL  194   HDL  44   LDL  Cannot estimate LDL when triglyceride exceeds 400 mg/dL   TRIG  411*     No results found for: A1C  Potassium   Date Value Ref Range Status   08/16/2017 4.0 3.4 - 5.3 mmol/L Final     Phosphorus: 6.9 today, prev high on 7/4- 6.5       Vitamins, Supplements, Herbals, Pertinent Meds:   None vs 2 tums/meal listed on med list but not in chart    Dialysis Modality: HD  Days/Times: T/Th/Sat 630a-1030a  Dialysis Start: 5/2017  Pt receives protein supplement with dialysis: N    Nutrition History  Pt-reported special diets/eating habits: none   Dining out/food not made at home: has cut down since dialysis, but previously was never very frequent   Appetite: normal/good    Pt or his wife cook at home. They add salt, have reduced the amount they add, and do not use any canned or processed foods. They do not cook with high sodium sauces. Pt does not eat eggs and limits the amount of pork consumed d/t gout. Reviewed high Phos foods d/t high level, but cannot find anything in his diet that is contributing to this elevated level.     Recall:  B: rice + homemade broth cooked with greens   L: same as BF +  "salmon, tilapia, or poultry  D: same as L  Sn: occasional croissant, cucumbers, strawberries   Beverages: water, soybean drink 2x/month   ETOH (1 drink = 12 oz beer, 5 oz wine, 1.5 oz liquor): none     Physical Activity  Farming, yard work, gardening      MALNUTRITION  % Intake:  No decreased intake noted  % Weight Loss:  Weight loss does not meet criteria for malnutrition   Subcutaneous Fat Loss:  None  Muscle Loss:  None  Fluid Accumulation/Edema:  None noted  Malnutrition Diagnosis: Patient does not meet two of the above criteria necessary for diagnosing malnutrition    Nutrition Prescription  Energy:  1909-5022     (25-30 kcal/kg for maintenance)     Protein:  61-71    (1.2-1.4 g/kg for HD/PD needs)         Fluid:  1 ml/kcal or per MD        Micronutrient:  Na+: <2000 mg/day  K+: 8211-9475 mg/day  Phos: 800-1000 mg/day          Nutrition Diagnosis:  Food and nutrition related knowledge deficit r/t pre kidney transplant eval AEB pt verbalized not hearing pre/post transplant diet guidelines.    Nutrition Intervention:  Nutrition education provided:  Discussed sodium intake (low sodium foods and drinks, seasoning food without salt and tips for low sodium diet). Reviewed high phos foods but level may not be correlated to his food intake. Reviewed need for adequate protein with dialysis.     Reviewed post txp diet guidelines in brief (will review in further detail post txp):   (1) Review of proper food safety measures d/t immunosuppressant therapy post-op and increased risk for food-borne illness (2) Stressed importance of not taking any herbal/Chinese/alternative medicines or supplements post txp (d/t risk for rejection, unknown effects on the organs, potential interactions with immunosuppresants). (3) Med regimen and possible side effects  Pt and dtr report that family do have \"natural remedies\", but discussed that this would be unsafe after a transplant. Pt/dtr expressed understanding.     Patient Understanding: " Pt verbalized understanding of education provided.  Expected Compliance: Good  Follow-Up Plans: PRN     Nutrition Goals:  1. Limit Na+ <2000mg/day  2. Minimum of 61 g protein/day    Provided pt with contact info.   Time spent with patient: 30 minutes.  Smita Reina RD, LD

## 2017-08-16 NOTE — MR AVS SNAPSHOT
After Visit Summary   8/16/2017    Dada Medley    MRN: [id number]           Patient Information     Date Of Birth          1955        Visit Information        Provider Department      8/16/2017 11:00 AM Susan Keyes RN; DARLEEN/ADRI MAHARAJ [facility] Solid Organ Transplant        Today's Diagnoses     Organ transplant candidate    -  1    End stage renal disease on dialysis (H)           Follow-ups after your visit        Who to contact     If you have questions or need follow up information about today's clinic visit or your schedule please contact [facility] SOLID ORGAN TRANSPLANT directly at [phone].  Normal or non-critical lab and imaging results will be communicated to you by PWRFhart, letter or phone within 4 business days after the clinic has received the results. If you do not hear from us within 7 days, please contact the clinic through Temnost or phone. If you have a critical or abnormal lab result, we will notify you by phone as soon as possible.  Submit refill requests through CareFamily or call your pharmacy and they will forward the refill request to us. Please allow 3 business days for your refill to be completed.          Additional Information About Your Visit        MyChart Information     CareFamily gives you secure access to your electronic health record. If you see a primary care provider, you can also send messages to your care team and make appointments. If you have questions, please call your primary care clinic.  If you do not have a primary care provider, please call [phone] and they will assist you.        Care EveryWhere ID     This is your Care EveryWhere ID. This could be used by other organizations to access your [place] medical records  [id number]         Blood Pressure from Last 3 Encounters:   08/16/17 114/72   08/16/17 114/72    Weight from Last 3 Encounters:   08/16/17 61.2 kg (135 lb)   08/16/17 61.2 kg (135 lb)              Today, you had the following      No orders found for display       Primary Care Provider Office Phone # Fax #    Linwood Guerrero [phone]       [address] [address]        Equal Access to Services     MELLISA RAMIREZ : Hadii aad ku hadtiffanietorsten Soshariali, waaxda luqadaha, qaybta kaalmada adealbertada, ramona tacoin hayaacarlee sandhu cameronlisa moeller. So [facility] [phone].    ATENCIÓN: Si habla español, tiene a polanco disposición servicios gratuitos de asistencia lingüística. Llame al [phone].    We comply with applicable federal civil rights laws and [place] laws. We do not discriminate on the basis of race, color, national origin, age, disability sex, sexual orientation or gender identity.            Thank you!     Thank you for choosing [facility] SOLID ORGAN TRANSPLANT  for your care. Our goal is always to provide you with excellent care. Hearing back from our patients is one way we can continue to improve our services. Please take a few minutes to complete the written survey that you may receive in the mail after your visit with us. Thank you!             Your Updated Medication List - Protect others around you: Learn how to safely use, store and throw away your medicines at www.disposemymeds.org.          This list is accurate as of: 8/16/17 11:59 PM.  Always use your most recent med list.                   Brand Name Dispense Instructions for use Diagnosis    LOSARTAN POTASSIUM PO      Take 50 mg by mouth daily        PREDNISONE PO      Take 20 mg by mouth 2 times daily

## 2017-08-16 NOTE — PROGRESS NOTES
Pre Abdominal Organ Transplant Coordinator Evaluation Note:    MD present: Dr. Plata, LUISITO Hernadez with Dr. Pollack or Christoph or Genevieve     Attendees: self and daughter    Type of transplant: kidney    Required Topic(s) Discussed: Evaluation notification document, SRTR data, Multiple wait list brochure, KDPI, Evaluation/approval process, Selection committee process, Wait list process and Living donor process    Teaching: Instruct patient on living donor process/provided contact info, Provided my business card and To call me with any questions    Assessment/Plan: 1) Selection Committee August 23, 2017 2) Pt's daughter wants to find out what he needs and then schedule locally or decide if he /they will return to [facility]. 3) Letter to be sent to her email or regular mail ; [email or web address] 4) they know due to his previously started dialysis we can use that time for  Wait list.  They understand evaluation and next steps.    Time spent with patient: 30 minutes

## 2017-08-17 LAB
HLA TYPING COMPLETE SOT RECIPIENT: NORMAL
INTERPRETATION ECG - MUSE: NORMAL
LA PPP-IMP: NEGATIVE
M TB TUBERC IFN-G BLD QL: NEGATIVE
M TB TUBERC IFN-G/MITOGEN IGNF BLD: 0 IU/ML
PRA SINGLE ANTIGEN IGG ANTIBODY: NORMAL

## 2017-08-18 PROBLEM — Z99.2 END STAGE RENAL DISEASE ON DIALYSIS (H): Status: ACTIVE | Noted: 2017-05-02

## 2017-08-18 PROBLEM — N18.6 END STAGE RENAL DISEASE ON DIALYSIS (H): Status: ACTIVE | Noted: 2017-05-02

## 2017-08-22 LAB
A* LOCUS NMDP: NORMAL
A* LOCUS: NORMAL
A* NMDP: NORMAL
ABTEST METHOD: NORMAL
B* LOCUS NMDP: NORMAL
B* LOCUS: NORMAL
B* NMDP: NORMAL
B*: NORMAL
BW-1: NORMAL
BW-2: NORMAL
C* LOCUS NMDP: NORMAL
C* LOCUS: NORMAL
C* NMDP: NORMAL
C*: NORMAL
COPATH REPORT: NORMAL
DPA1* NMDP: NORMAL
DPA1*: NORMAL
DPB1* LOCUS NMDP: NORMAL
DPB1* NMDP: NORMAL
DPB1*: NORMAL
DPB1*LOCUS: NORMAL
DQA1*: NORMAL
DQA1*LOCUS NMDP: NORMAL
DQA1*LOCUS: NORMAL
DQB1* LOCUS NMDP: NORMAL
DQB1* LOCUS: NORMAL
DQB1* NMDP: NORMAL
DRB1* LOCUS NMDP: NORMAL
DRB1* LOCUS: NORMAL
DRB1* NMDP: NORMAL
DRB1*: NORMAL
DRB3* LOCUS NMDP: NORMAL
DRB3* LOCUS: NORMAL
DRB3* NMDP: NORMAL
DRB3*: NORMAL
DRSSO TEST METHOD: NORMAL

## 2017-08-23 ENCOUNTER — COMMITTEE REVIEW (OUTPATIENT)
Dept: TRANSPLANT | Facility: CLINIC | Age: 62
End: 2017-08-23

## 2017-08-23 NOTE — LETTER
September 8, 2017    Long Medley  [address] [place] [address]    Dear Mr. Medley,    It was a pleasure to see you recently for consideration of kidney transplantation with you daughter Eloisa Medley.  Your pre-transplant evaluation appointments were here on August 16, 2017. Your evaluation results were reviewed at our Multidisciplinary Selection Committee on August 23, 2017. The Committee has approved you as a candidate for kidney transplant recipient pending the successful completion of the following things.     1. Obtain a Urology consult locally: Reason history of nephrolithiasis two episodes of lithotripsy in past 10 years, last in 2013. Assess kidney, bladder prior to placement of transplant kidney.  ( If local center does CT of abdomen, Iliac doppler is not needed)  2. Obtain Stress echocardiogram of any type; exercise stress echocardiogram; or Dobutamine stress or Lexiscan or Adenosine Thallium  3. Obtain Cardiology consult ( cardiologist can determine what type of stress echocardiogram) Reason: HTN, renal failure assess heart risk pre surgery  4. Obtain Bilateral Iliac artery Ultrasound: Assess vessels for patency pre kidney transplant   5. Obtain Colonoscopy  6. Obtain Dental assessment to look for gum or bone disease or infection; send statement of assessment      After your evaluation items (1-6) are completed and sent to the transplant office, our team will review them and give determination for your candidacy.      Your name has not been added to [employer] (UNOS) Wait List at this time.  The UNOS kidney allocation allows for Wait List time to be retroactive to your first dialysis start date; 5/2/2017.  When the team determines candidacy and approves you for Active kidney wait list, I can then place your name on the list using the May 2, 2017 date    Should you have persons interested in kidney donation, they may now register online at [email or web address] with  our Donor Program to initiate their process and possibly start  evaluation.   When a donor is approved they will be notified by the Donor Team. You will be able to proceed with a live donor kidney transplant as soon as you are medically approved to proceed.     You or your providers may call me with any questions about your evaluation or this letter.  My number is [phone]    Sincerely,       Susan Keyes RN BSN   On behalf of the Pre Kidney Transplant Program       Encl:  UNOS form     CC: Linwood Guerrero MD; Juliocesar [facility], Daughter Eloisa Medley

## 2017-08-30 NOTE — COMMITTEE REVIEW
Abdominal Committee Review Note     Evaluation Date: 8/16/2017  Committee Review Date: 8/23/2017    Organ being evaluated for: Kidney    Transplant Phase: Evaluation  Transplant Status: Active    Transplant Coordinator: Susan Keyes  Transplant Surgeon:       Referring Physician: Jake Reyes    Primary Diagnosis: Other, Specify - obstruction, stone, HTN   Secondary Diagnosis:     Committee Review Members:  Nephrology Manuel Pollack MD   Nutrition Smita Reina, RD   Pharmacy John Melo, [facility]    - Clinical Dary Garrett, [facility]   Transplant May Ulrich PA-C, Fay Faustin, RN, Manuela Gaston, RN, Kelly Prasad LPN, Cande Jacobson, BRENNA, Rosemarie Kim, RN, Susan Keyes, PARMJIT, Shilpa Payne, RN, Bindu Butler, RN, Sergio Glaser MD       Transplant Eligibility: Irreversible chronic kidney disease treated w/dialysis or expected need for dialysis    Committee Review Decision: rediscussion after evaluation items completed.   Relative Contraindications: None    Absolute Contraindications: None  Committee Chair Sergio Glaser MD verbally attested to the committee's decision.    Committee Discussion Details:     LUISITO Hernadez presented to the team. With MD Sergio García MD surgeon.   Assessment and Plan:  1. Kidney good candidate   2. ESKD from unknown etiology but presumed secondary to hypertension and possible obstruction -   3. Nephrolithiasis - 2 episodes requiring lithotripsy in the past 10 years, last  in 2013. Urology records are needed. He has urine output without any difficulties. Obtain Urology consult   4. Cardiac risk - he has no known history of cardiac disease or events, and is asymptomatic with exertion, but he does have multiple cardiac risk factors, and so he will need cardiac risk assessment.  5. Hypertriglyceridemia - follow up with PCP for management.   6. Health maintenance - verify colonoscopy completed  Obtain note from dentist there is no  gum or bone disease  7. Stress echocardiogram either exercise or Dobutamine, or Lexiscan or Adenosine Thallium   8. Illiac doppler Ultrasound to assess patency of vessels pre kidney transplant  9. Aorto Ultrasound assess for AAA    Immunosuppression: plan for standard induction with plan for tac, mmf.   Willing donor, daughter  Not list at this time as pt is on dialysis will get back dated from 5/2/2017.   Patient /daughter ask to have remaining items done in his home state of [place].  Email to be sent to his daughter.  [place] is doing evaluation 10/5. They have said they can assist with obtaining items.    Contacted patient to review outcome of selection committee meeting (See selection committee encounter).   Explained to patient that he needs to complete all components of the evaluation to be eligible for active status on the waiting list.   Mr. Medley may proceed with a live donor kidney transplant.   Reviewed next steps based on outcomes:   Patient will not be listed (patient is on dialysis and evaluation is not complete), patient will receive:    - An Evaluation Summary Letter indicating what is needed to complete evaluation-discussed with patient if they would like to have testing done with [facility] or locally  Confirmed with patient that on successful completion of outstanding components,  After patient reviewed again with committee and is eligible for active status and they will receive a follow-up call.   Confirmed that patient has contact information for additional questions or concerns.

## 2017-09-01 ENCOUNTER — TELEPHONE (OUTPATIENT)
Dept: TRANSPLANT | Facility: CLINIC | Age: 62
End: 2017-09-01

## 2017-09-01 NOTE — TELEPHONE ENCOUNTER
Call from Aleida Medley daughter to Dada Medley discussing how to go about getting orders done locally in [place].      I called Angi Momin at  [phone] multiple options to reach kidney coordinator and LM with her for assistance in arranging cardiology and iliac doppler testing in [place] for [facility] transplant program.

## 2017-09-06 ENCOUNTER — TELEPHONE (OUTPATIENT)
Dept: TRANSPLANT | Facility: CLINIC | Age: 62
End: 2017-09-06

## 2017-09-06 LAB
PROTOCOL CUTOFF: NORMAL
SA1 CELL: NORMAL
SA1 COMMENTS: NORMAL
SA1 HI RISK ABY: NORMAL
SA1 MOD RISK ABY: NORMAL
SA1 TEST METHOD: NORMAL
SA2 CELL: NORMAL
SA2 COMMENTS: NORMAL
SA2 HI RISK ABY UA: NORMAL
SA2 MOD RISK ABY: NORMAL
SA2 TEST METHOD: NORMAL
UNACCEPTABLE ANTIGEN: NORMAL
UNOS CPRA: 61

## 2017-09-06 NOTE — TELEPHONE ENCOUNTER
Angi PARNELL from [place], [place] called to discuss Dada Medlye. She states Mr. Medley will have evaluation on 10/5/2017.    Susan to fax letter and notes and labs to Angi for Mr. Medley; [phone]  Angi's phone # is [phone].    Angi states they may do a KUB, if abnormal they would do a CT of abd pelvis.  US doppler can be on shown on CT of abd pelvis.   May Galindo daughter aware of the plan.

## 2017-09-08 ENCOUNTER — TELEPHONE (OUTPATIENT)
Dept: TRANSPLANT | Facility: CLINIC | Age: 62
End: 2017-09-08

## 2017-09-27 ENCOUNTER — MEDICAL CORRESPONDENCE (OUTPATIENT)
Dept: TRANSPLANT | Facility: CLINIC | Age: 62
End: 2017-09-27

## 2017-10-11 ENCOUNTER — MEDICAL CORRESPONDENCE (OUTPATIENT)
Dept: TRANSPLANT | Facility: CLINIC | Age: 62
End: 2017-10-11

## 2018-03-21 ENCOUNTER — TELEPHONE (OUTPATIENT)
Dept: TRANSPLANT | Facility: CLINIC | Age: 63
End: 2018-03-21

## 2018-03-21 NOTE — TELEPHONE ENCOUNTER
I called daughter Eloisa Aaron asking for report on her father.   May reported her father has been having issues of pain with dialysis and has chosen to STOP dialysis.  He has not been on dialysis for the last three months.    May is surprised, as am I,  at his still being alive this long after stopping dialysis. She said it is day to day regarding his life.    Plan.  I will write a letter to her father and cc to PCP with statement that we are unable to complete the evaluation towards transplantation with his decision to stop dialysis.  May understands completely.     She thanked me for all my work and my call into his case. May apologized for not calling me in transplant program. She is coping around the gravity / nature of his decision. She appreciated me caring for the individual and family and not 'just a job'.  I connected her with Sara Clark, Abdominal .

## 2018-03-21 NOTE — LETTER
03/21/18    Long Medley  [address] [address] [place] [address]    Dear Dada,    It was a pleasure to met you and your family at [facility] for evaluation and consideration of kidney transplantation at our facility.  There were pre-transplant evaluation items that our team needed from you to determine if you were an acceptable candidate at our facility.   Per the telephone conversation today with your daughter May,you chose to stop dialysis our team will end /close your evaluation at [facility] and not consider you a kidney transplant candidate.       Our program recommends dialysis for the treatment of your renal failure. We understand your health care is your decision and we support you and your family.      Should you or your providers have questions about this letter you may call Susan directly at [phone] or the Transplant Office at [phone] option 5.  It was a pleasure meeting you and your family at [facility].        Sincerely,    STACEY CarboneN RN on behalf of the   Solid Kidney Organ Transplant Program  [facility], [facility]    Encl:  [facility] brochure     CC:  Linwood Guerrero MD;  [facility] center.

## 2018-04-05 ENCOUNTER — TELEPHONE (OUTPATIENT)
Dept: TRANSPLANT | Facility: CLINIC | Age: 63
End: 2018-04-05

## 2018-04-05 NOTE — TELEPHONE ENCOUNTER
I called Ely, Coordinator in [place] and informed her Mr. Dada Medley has chosen to end evaluation.   I called Eloisa Medley daughter and thanked her for phone call to supervisor.

## 2018-08-03 ENCOUNTER — TRANSFERRED RECORDS (OUTPATIENT)
Dept: HEALTH INFORMATION MANAGEMENT | Facility: CLINIC | Age: 63
End: 2018-08-03

## 2018-08-27 ENCOUNTER — TRANSFERRED RECORDS (OUTPATIENT)
Dept: HEALTH INFORMATION MANAGEMENT | Facility: CLINIC | Age: 63
End: 2018-08-27

## 2018-08-29 ENCOUNTER — TELEPHONE (OUTPATIENT)
Dept: TRANSPLANT | Facility: CLINIC | Age: 63
End: 2018-08-29

## 2018-08-29 NOTE — TELEPHONE ENCOUNTER
Dada Medley decided to resume dialysis in [place].  SAMAN Ruth called to report he wants to return to [place] to restart a new referral for transplant listing.   I connected Flory DAVISON with INTAKE to restart NEW Referral.  crissy Sterling, Adriana Self, and Adriana Vo.

## 2018-08-30 ENCOUNTER — REFERRAL (OUTPATIENT)
Dept: TRANSPLANT | Facility: CLINIC | Age: 63
End: 2018-08-30

## 2018-08-30 NOTE — LETTER
10/17/18    Dada Medley  [address] [place] [address] [place] [address]    Dear Dada,    Thank you for your interest in the Transplant Center at [facility], [facility]. We look forward to being a part of your care team and assisting you through the transplant process.    As we discussed, your transplant coordinator is Susan Keyes (Kidney).  You may call your coordinator at any time with questions or concerns.  Your first scheduled call will be on 10/19/18.  If this needs to change, call [phone].    Please complete the following.    1. Fill out and return the enclosed forms    Authorization for Electronic Communication    Authorization to Discuss Protected Health Information    [email or web address] Release of Information    2. Sign up for:    [employer], access to your electronic medical record (see enclosed pamphlet)    [employer]alt[employer], a transplant education website (see enclosed booklet)    You can use these tools to learn more about your transplant, communicate with your care team, and track your medical details      Sincerely,      Solid Organ Transplant  [facility], [facility]    cc: Darwin Guerrero MD; [facility]

## 2018-08-30 NOTE — LETTER
September 24, 2018        Dada Medley  [address] [place] [address]      Dear Dada,       You have recently expressed interest in our Solid Organ Transplant Program and have requested to begin the evaluation process.  We have made several attempts to get in touch with you but have been unable to reach you.    If you are still interested and/or have any questions, please contact us at  [phone] Option #6 or [phone]   Monday - Friday, between the hours of 8:30am and 5:00pm central time.    We look forward to hearing from you.      Regards,     Solid Organ Transplant Intake   [facility]  [address], [facility] [address]  [place] [address]

## 2018-09-27 ENCOUNTER — DOCUMENTATION ONLY (OUTPATIENT)
Dept: TRANSPLANT | Facility: CLINIC | Age: 63
End: 2018-09-27

## 2018-09-27 VITALS — BODY MASS INDEX: 22.47 KG/M2 | HEIGHT: 65 IN

## 2018-09-27 NOTE — TELEPHONE ENCOUNTER
Insurance information:  MEDICARE  Policy parks:  SELF  Subscriber/policy/ID number:  [id number]  Group Number:      Health Maintenance:  Colon:  UTD  PSA:  UNKNOWN  Dental: UTD  Vaccines: UTD    Is patient in a group home/assisted living? NO  Does patient have a guardian? NO    Referral intake process completed.  Patient is aware that after financial approval is received, medical records will be requested.   Patient confirmed for a callback from transplant coordinator on 10/5/18.  Tentative evaluation date Not scheduled  Confirmed coordinator will discuss evaluation process in more detail at the time of their call.   Patient is aware of the need to arrange age appropriate cancer screening, vaccinations, and dental care.  Reminded patient to complete questionnaire, complete medical records release, and review packet prior to evaluation visit .  Assessed patient for special needs (ie--wheelchair, assistance, guardian, and ):  NONE   Patient instructed to call [phone] with questions.

## 2018-10-05 NOTE — TELEPHONE ENCOUNTER
Susan HAYNES with May; similar message to her that Adriana Vo left: daughter Eloisa Medley [phone] regarding the need for a Part D drug plan and supplementary coverage to Medicare - left my number for today and also July Juan's number - explained once he had adequate coverage for transplant his evaluation can be scheduled and his wait time will go back to his dialysis start date.

## 2018-10-19 NOTE — TELEPHONE ENCOUNTER
"Susan called Eloisa Medley to discuss evaluation.   May reports having been told to have Part D and Supplement in place prior to coming for evaluation per Adriana Vo.    May and July Martinez talked.   May and I spoke again:   1) May states she is enrolling Mr. Medley into Part D and will have by 1/2019  2) May aware of needing to get a supplement.  She asks \"What exactly are the charges and amount of $ for family to pay month /by Month when they are responsible for 20% of the costs  in Pre K EVAL    She will not schedule an evaluation until Part D enrollment occurs and estimates are given to her for the 20% responsibilities.   crissy Nelson and July   I changed episode to $ still needed.   "

## 2018-11-02 NOTE — TELEPHONE ENCOUNTER
Daughter decided to go to [facility] for evaluation, if he/she decides to come here we will reopen referral.  $ issues in [place]  Referral ended. /resolved

## 2018-12-11 ENCOUNTER — TELEPHONE (OUTPATIENT)
Dept: TRANSPLANT | Facility: CLINIC | Age: 63
End: 2018-12-11

## 2018-12-11 NOTE — TELEPHONE ENCOUNTER
Call from Leti from [facility] left voice message asking for UNOS listing letter on Dada Medley  1955.    Reference number [id number]    [facility] did not list Mr. Dada Medley. His evaluation was not completed.  He was on Dialysis so when the team would approve him we would have used his chronic dialysis start date of  2017.    I called back and talked with Darren and informed him of the above. Pt was not listed with UNOS at our facility.

## 2019-10-03 ENCOUNTER — HEALTH MAINTENANCE LETTER (OUTPATIENT)
Age: 64
End: 2019-10-03

## 2020-02-08 ENCOUNTER — HEALTH MAINTENANCE LETTER (OUTPATIENT)
Age: 65
End: 2020-02-08

## 2020-11-07 ENCOUNTER — HEALTH MAINTENANCE LETTER (OUTPATIENT)
Age: 65
End: 2020-11-07

## 2021-03-27 ENCOUNTER — HEALTH MAINTENANCE LETTER (OUTPATIENT)
Age: 66
End: 2021-03-27

## 2021-09-05 ENCOUNTER — HEALTH MAINTENANCE LETTER (OUTPATIENT)
Age: 66
End: 2021-09-05

## 2022-04-17 ENCOUNTER — HEALTH MAINTENANCE LETTER (OUTPATIENT)
Age: 67
End: 2022-04-17

## 2022-10-23 ENCOUNTER — HEALTH MAINTENANCE LETTER (OUTPATIENT)
Age: 67
End: 2022-10-23

## 2023-11-11 ENCOUNTER — HEALTH MAINTENANCE LETTER (OUTPATIENT)
Age: 68
End: 2023-11-11